# Patient Record
Sex: FEMALE | Race: WHITE | URBAN - METROPOLITAN AREA
[De-identification: names, ages, dates, MRNs, and addresses within clinical notes are randomized per-mention and may not be internally consistent; named-entity substitution may affect disease eponyms.]

---

## 2022-09-22 ENCOUNTER — OFFICE VISIT (OUTPATIENT)
Dept: URBAN - METROPOLITAN AREA CLINIC 51 | Facility: CLINIC | Age: 67
End: 2022-09-22
Payer: MEDICARE

## 2022-09-22 DIAGNOSIS — H04.123 TEAR FILM INSUFFICIENCY OF BILATERAL LACRIMAL GLANDS: ICD-10-CM

## 2022-09-22 DIAGNOSIS — H25.13 AGE-RELATED NUCLEAR CATARACT, BILATERAL: Primary | ICD-10-CM

## 2022-09-22 DIAGNOSIS — H43.313 VITREOUS MEMBRANES AND STRANDS, BILATERAL: ICD-10-CM

## 2022-09-22 DIAGNOSIS — H52.4 PRESBYOPIA: ICD-10-CM

## 2022-09-22 PROCEDURE — 92004 COMPRE OPH EXAM NEW PT 1/>: CPT | Performed by: OPTOMETRIST

## 2022-09-22 PROCEDURE — 92134 CPTRZ OPH DX IMG PST SGM RTA: CPT | Performed by: OPTOMETRIST

## 2022-09-22 ASSESSMENT — VISUAL ACUITY
OD: 20/20
OS: 20/20

## 2022-09-22 ASSESSMENT — INTRAOCULAR PRESSURE
OS: 14
OD: 14

## 2022-09-22 ASSESSMENT — KERATOMETRY
OD: 45.95
OS: 45.59

## 2022-09-22 NOTE — IMPRESSION/PLAN
Impression: Presbyopia: H52.4. Plan: Discussed mild changes in prescription and astigmatism, patient prefers.  Release new SRx per patient request.

## 2022-09-22 NOTE — IMPRESSION/PLAN
Impression: Age-related nuclear cataract, bilateral: H25.13. Plan: Educated patient on condition and findings. Cataracts are not visually significant or bothersome enough to warrant surgical intervention at this time. Patient will monitor vision closely and contact our office with any changes/ worsening in vision. Will re-evaluate on return visit.

## 2022-09-22 NOTE — IMPRESSION/PLAN
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 Plan: Recommend AT's as needed for comfort if eyes ever feel dry, gritty, water or irritated.